# Patient Record
Sex: FEMALE | Race: WHITE | NOT HISPANIC OR LATINO | Employment: FULL TIME | URBAN - NONMETROPOLITAN AREA
[De-identification: names, ages, dates, MRNs, and addresses within clinical notes are randomized per-mention and may not be internally consistent; named-entity substitution may affect disease eponyms.]

---

## 2017-04-12 ENCOUNTER — APPOINTMENT (OUTPATIENT)
Dept: PREADMISSION TESTING | Facility: HOSPITAL | Age: 27
End: 2017-04-12

## 2017-04-12 ENCOUNTER — HOSPITAL ENCOUNTER (OUTPATIENT)
Dept: GENERAL RADIOLOGY | Facility: HOSPITAL | Age: 27
Discharge: HOME | End: 2017-04-12
Admitting: ORTHOPAEDIC SURGERY

## 2017-04-12 VITALS — HEIGHT: 66 IN | BODY MASS INDEX: 32.14 KG/M2 | WEIGHT: 200 LBS

## 2017-04-12 LAB
ANION GAP SERPL CALCULATED.3IONS-SCNC: 9.7 MMOL/L
BUN BLD-MCNC: 7 MG/DL (ref 7–20)
BUN/CREAT SERPL: 10 (ref 7.1–23.5)
CALCIUM SPEC-SCNC: 9.4 MG/DL (ref 8.4–10.2)
CHLORIDE SERPL-SCNC: 106 MMOL/L (ref 98–107)
CO2 SERPL-SCNC: 30 MMOL/L (ref 26–30)
CREAT BLD-MCNC: 0.7 MG/DL (ref 0.6–1.3)
DEPRECATED RDW RBC AUTO: 42.1 FL (ref 37–54)
ERYTHROCYTE [DISTWIDTH] IN BLOOD BY AUTOMATED COUNT: 12.8 % (ref 11.5–14.5)
GFR SERPL CREATININE-BSD FRML MDRD: 100 ML/MIN/1.73
GLUCOSE BLD-MCNC: 91 MG/DL (ref 74–98)
HCT VFR BLD AUTO: 40.4 % (ref 37–47)
HGB BLD-MCNC: 13.3 G/DL (ref 12–16)
MCH RBC QN AUTO: 29.8 PG (ref 27–31)
MCHC RBC AUTO-ENTMCNC: 32.9 G/DL (ref 30–37)
MCV RBC AUTO: 90.4 FL (ref 81–99)
PLATELET # BLD AUTO: 208 10*3/MM3 (ref 130–400)
PMV BLD AUTO: 11.2 FL (ref 6–12)
POTASSIUM BLD-SCNC: 3.7 MMOL/L (ref 3.5–5.1)
RBC # BLD AUTO: 4.47 10*6/MM3 (ref 4.2–5.4)
SODIUM BLD-SCNC: 142 MMOL/L (ref 137–145)
WBC NRBC COR # BLD: 5.72 10*3/MM3 (ref 4.8–10.8)

## 2017-04-12 PROCEDURE — 71020 HC CHEST PA AND LATERAL: CPT

## 2017-04-12 PROCEDURE — 85027 COMPLETE CBC AUTOMATED: CPT

## 2017-04-12 PROCEDURE — 80048 BASIC METABOLIC PNL TOTAL CA: CPT

## 2017-04-12 PROCEDURE — 36415 COLL VENOUS BLD VENIPUNCTURE: CPT

## 2017-04-12 RX ORDER — NAPROXEN 500 MG/1
500 TABLET ORAL 2 TIMES DAILY WITH MEALS
COMMUNITY
End: 2018-03-21

## 2017-04-19 ENCOUNTER — ANESTHESIA EVENT (OUTPATIENT)
Dept: PERIOP | Facility: HOSPITAL | Age: 27
End: 2017-04-19

## 2017-04-19 ENCOUNTER — HOSPITAL ENCOUNTER (OUTPATIENT)
Facility: HOSPITAL | Age: 27
Setting detail: HOSPITAL OUTPATIENT SURGERY
Discharge: HOME | End: 2017-04-19
Attending: ORTHOPAEDIC SURGERY | Admitting: ORTHOPAEDIC SURGERY

## 2017-04-19 ENCOUNTER — ANESTHESIA (OUTPATIENT)
Dept: PERIOP | Facility: HOSPITAL | Age: 27
End: 2017-04-19

## 2017-04-19 VITALS
SYSTOLIC BLOOD PRESSURE: 116 MMHG | TEMPERATURE: 98.2 F | OXYGEN SATURATION: 98 % | DIASTOLIC BLOOD PRESSURE: 66 MMHG | RESPIRATION RATE: 16 BRPM | HEART RATE: 84 BPM

## 2017-04-19 PROCEDURE — 25010000002 EPINEPHRINE 1 MG/ML SOLUTION: Performed by: ORTHOPAEDIC SURGERY

## 2017-04-19 PROCEDURE — 25010000002 MEPERIDINE PER 100 MG

## 2017-04-19 PROCEDURE — 25010000002 MIDAZOLAM PER 1 MG: Performed by: NURSE ANESTHETIST, CERTIFIED REGISTERED

## 2017-04-19 PROCEDURE — 25010000002 HYDROMORPHONE PER 4 MG

## 2017-04-19 PROCEDURE — 25010000002 DEXAMETHASONE PER 1 MG: Performed by: NURSE ANESTHETIST, CERTIFIED REGISTERED

## 2017-04-19 PROCEDURE — 25010000002 FENTANYL CITRATE (PF) 250 MCG/5ML SOLUTION: Performed by: NURSE ANESTHETIST, CERTIFIED REGISTERED

## 2017-04-19 PROCEDURE — 25010000002 PROPOFOL 200 MG/20ML EMULSION: Performed by: NURSE ANESTHETIST, CERTIFIED REGISTERED

## 2017-04-19 RX ORDER — SODIUM CHLORIDE, SODIUM LACTATE, POTASSIUM CHLORIDE, CALCIUM CHLORIDE 600; 310; 30; 20 MG/100ML; MG/100ML; MG/100ML; MG/100ML
1000 INJECTION, SOLUTION INTRAVENOUS CONTINUOUS PRN
Status: DISCONTINUED | OUTPATIENT
Start: 2017-04-19 | End: 2017-04-19 | Stop reason: HOSPADM

## 2017-04-19 RX ORDER — MEPERIDINE HYDROCHLORIDE 25 MG/ML
25 INJECTION INTRAMUSCULAR; INTRAVENOUS; SUBCUTANEOUS
Status: DISCONTINUED | OUTPATIENT
Start: 2017-04-19 | End: 2017-04-19 | Stop reason: HOSPADM

## 2017-04-19 RX ORDER — OXYCODONE AND ACETAMINOPHEN 10; 325 MG/1; MG/1
TABLET ORAL
Status: DISCONTINUED
Start: 2017-04-19 | End: 2017-04-19 | Stop reason: HOSPADM

## 2017-04-19 RX ORDER — BUPIVACAINE HYDROCHLORIDE AND EPINEPHRINE 5; 5 MG/ML; UG/ML
INJECTION, SOLUTION EPIDURAL; INTRACAUDAL; PERINEURAL
Status: DISCONTINUED
Start: 2017-04-19 | End: 2017-04-19 | Stop reason: HOSPADM

## 2017-04-19 RX ORDER — ONDANSETRON 2 MG/ML
4 INJECTION INTRAMUSCULAR; INTRAVENOUS ONCE
Status: DISCONTINUED | OUTPATIENT
Start: 2017-04-19 | End: 2017-04-19 | Stop reason: HOSPADM

## 2017-04-19 RX ORDER — FENTANYL CITRATE 50 UG/ML
INJECTION, SOLUTION INTRAMUSCULAR; INTRAVENOUS AS NEEDED
Status: DISCONTINUED | OUTPATIENT
Start: 2017-04-19 | End: 2017-04-19 | Stop reason: SURG

## 2017-04-19 RX ORDER — GABAPENTIN 300 MG/1
CAPSULE ORAL
Status: DISCONTINUED
Start: 2017-04-19 | End: 2017-04-19 | Stop reason: HOSPADM

## 2017-04-19 RX ORDER — PROPOFOL 10 MG/ML
INJECTION, EMULSION INTRAVENOUS AS NEEDED
Status: DISCONTINUED | OUTPATIENT
Start: 2017-04-19 | End: 2017-04-19 | Stop reason: SURG

## 2017-04-19 RX ORDER — ACETAMINOPHEN 500 MG
TABLET ORAL
Status: DISCONTINUED
Start: 2017-04-19 | End: 2017-04-19 | Stop reason: HOSPADM

## 2017-04-19 RX ORDER — OXYCODONE AND ACETAMINOPHEN 10; 325 MG/1; MG/1
1 TABLET ORAL ONCE
Status: DISCONTINUED | OUTPATIENT
Start: 2017-04-19 | End: 2017-04-19 | Stop reason: HOSPADM

## 2017-04-19 RX ORDER — GABAPENTIN 400 MG/1
800 CAPSULE ORAL ONCE
Status: DISCONTINUED | OUTPATIENT
Start: 2017-04-19 | End: 2017-04-19 | Stop reason: HOSPADM

## 2017-04-19 RX ORDER — SODIUM CHLORIDE 0.9 % (FLUSH) 0.9 %
3 SYRINGE (ML) INJECTION AS NEEDED
Status: DISCONTINUED | OUTPATIENT
Start: 2017-04-19 | End: 2017-04-19 | Stop reason: HOSPADM

## 2017-04-19 RX ORDER — MIDAZOLAM HYDROCHLORIDE 1 MG/ML
INJECTION INTRAMUSCULAR; INTRAVENOUS AS NEEDED
Status: DISCONTINUED | OUTPATIENT
Start: 2017-04-19 | End: 2017-04-19 | Stop reason: SURG

## 2017-04-19 RX ORDER — ONDANSETRON 2 MG/ML
INJECTION INTRAMUSCULAR; INTRAVENOUS
Status: DISCONTINUED
Start: 2017-04-19 | End: 2017-04-19 | Stop reason: HOSPADM

## 2017-04-19 RX ORDER — DEXAMETHASONE SODIUM PHOSPHATE 4 MG/ML
INJECTION, SOLUTION INTRA-ARTICULAR; INTRALESIONAL; INTRAMUSCULAR; INTRAVENOUS; SOFT TISSUE AS NEEDED
Status: DISCONTINUED | OUTPATIENT
Start: 2017-04-19 | End: 2017-04-19 | Stop reason: SURG

## 2017-04-19 RX ORDER — HYDROCODONE BITARTRATE AND ACETAMINOPHEN 7.5; 325 MG/1; MG/1
TABLET ORAL
Status: COMPLETED
Start: 2017-04-19 | End: 2017-04-19

## 2017-04-19 RX ORDER — FAMOTIDINE 10 MG/ML
INJECTION, SOLUTION INTRAVENOUS
Status: DISCONTINUED
Start: 2017-04-19 | End: 2017-04-19 | Stop reason: HOSPADM

## 2017-04-19 RX ORDER — CELECOXIB 100 MG/1
CAPSULE ORAL
Status: DISCONTINUED
Start: 2017-04-19 | End: 2017-04-19 | Stop reason: HOSPADM

## 2017-04-19 RX ORDER — CELECOXIB 100 MG/1
200 CAPSULE ORAL ONCE
Status: DISCONTINUED | OUTPATIENT
Start: 2017-04-19 | End: 2017-04-19 | Stop reason: HOSPADM

## 2017-04-19 RX ORDER — EPINEPHRINE 1 MG/ML
INJECTION INTRAMUSCULAR; INTRAVENOUS; SUBCUTANEOUS AS NEEDED
Status: DISCONTINUED | OUTPATIENT
Start: 2017-04-19 | End: 2017-04-19 | Stop reason: HOSPADM

## 2017-04-19 RX ORDER — MEPERIDINE HYDROCHLORIDE 25 MG/ML
INJECTION INTRAMUSCULAR; INTRAVENOUS; SUBCUTANEOUS
Status: COMPLETED
Start: 2017-04-19 | End: 2017-04-19

## 2017-04-19 RX ORDER — ACETAMINOPHEN 325 MG/1
650 TABLET ORAL ONCE
Status: DISCONTINUED | OUTPATIENT
Start: 2017-04-19 | End: 2017-04-19 | Stop reason: HOSPADM

## 2017-04-19 RX ORDER — HYDROCODONE BITARTRATE AND ACETAMINOPHEN 7.5; 325 MG/1; MG/1
1-2 TABLET ORAL EVERY 4 HOURS PRN
Qty: 50 TABLET | Refills: 0 | Status: SHIPPED | OUTPATIENT
Start: 2017-04-19 | End: 2017-05-25

## 2017-04-19 RX ORDER — BUPIVACAINE HYDROCHLORIDE AND EPINEPHRINE 5; 5 MG/ML; UG/ML
INJECTION, SOLUTION EPIDURAL; INTRACAUDAL; PERINEURAL AS NEEDED
Status: DISCONTINUED | OUTPATIENT
Start: 2017-04-19 | End: 2017-04-19 | Stop reason: HOSPADM

## 2017-04-19 RX ADMIN — FENTANYL CITRATE 50 MCG: 50 INJECTION, SOLUTION INTRAMUSCULAR; INTRAVENOUS at 07:59

## 2017-04-19 RX ADMIN — SODIUM CHLORIDE, POTASSIUM CHLORIDE, SODIUM LACTATE AND CALCIUM CHLORIDE 1000 ML: 600; 310; 30; 20 INJECTION, SOLUTION INTRAVENOUS at 07:00

## 2017-04-19 RX ADMIN — LIDOCAINE HYDROCHLORIDE 80 MG: 20 INJECTION, SOLUTION INTRAVENOUS at 07:59

## 2017-04-19 RX ADMIN — PROPOFOL 150 MG: 10 INJECTION, EMULSION INTRAVENOUS at 07:59

## 2017-04-19 RX ADMIN — DEXAMETHASONE SODIUM PHOSPHATE 4 MG: 4 INJECTION, SOLUTION INTRAMUSCULAR; INTRAVENOUS at 08:07

## 2017-04-19 RX ADMIN — SODIUM CHLORIDE 600 MG: 9 INJECTION, SOLUTION INTRAVENOUS at 07:55

## 2017-04-19 RX ADMIN — HYDROCODONE BITARTRATE AND ACETAMINOPHEN 1 TABLET: 7.5; 325 TABLET ORAL at 10:02

## 2017-04-19 RX ADMIN — Medication 0.5 MG: at 09:05

## 2017-04-19 RX ADMIN — FENTANYL CITRATE 50 MCG: 50 INJECTION, SOLUTION INTRAMUSCULAR; INTRAVENOUS at 08:01

## 2017-04-19 RX ADMIN — HYDROMORPHONE HYDROCHLORIDE 0.5 MG: 1 INJECTION, SOLUTION INTRAMUSCULAR; INTRAVENOUS; SUBCUTANEOUS at 09:05

## 2017-04-19 RX ADMIN — MEPERIDINE HYDROCHLORIDE 25 MG: 25 INJECTION, SOLUTION INTRAMUSCULAR; INTRAVENOUS; SUBCUTANEOUS at 08:47

## 2017-04-19 RX ADMIN — MIDAZOLAM HYDROCHLORIDE 2 MG: 1 INJECTION, SOLUTION INTRAMUSCULAR; INTRAVENOUS at 07:58

## 2017-04-19 RX ADMIN — MEPERIDINE HYDROCHLORIDE 25 MG: 25 INJECTION INTRAMUSCULAR; INTRAVENOUS; SUBCUTANEOUS at 08:47

## 2017-04-19 NOTE — NURSING NOTE
0935- drifts to sleep easily, awakens and responds appropiately. No moaning, grimacing or restlessness. lle elevated on pillow, ice pack in place, pedal pulse +, cap refill normal. Awakens and c/o throbbing pain. o2 sat at 94%, bp below baseline. Report to [facility], transferred to [facility] .

## 2017-04-19 NOTE — ANESTHESIA POSTPROCEDURE EVALUATION
Patient: Olga Flores    Procedure Summary     Date Anesthesia Start Anesthesia Stop Room / Location    04/19/17 0755  [facility] / [facility]       Procedure Diagnosis Surgeon Provider    RIGHT KNEE ARTHROSCOPY WITH LATERAL RELEASE (Right Knee) No diagnosis on file. MD Lane Ward CRNA          Anesthesia Type: general  Last vitals  BP      Temp      Pulse     Resp      SpO2        Post Anesthesia Care and Evaluation    Patient location during evaluation: PACU  Patient participation: complete - patient participated  Level of consciousness: awake and alert  Pain score: 9  Pain management: satisfactory to patient  Airway patency: patent  Anesthetic complications: No anesthetic complications  PONV Status: none  Cardiovascular status: acceptable and stable  Respiratory status: acceptable and face mask  Hydration status: acceptable

## 2017-04-19 NOTE — OP NOTE
DATE OF PROCEDURE:  04/19/2017    PREOPERATIVE DIAGNOSIS: Right knee lateral femoral compression syndrome.    POSTOPERATIVE DIAGNOSIS: Right knee lateral femoral compression syndrome.    PROCEDURES PERFORMED:  Right knee arthroscopy, lateral release.      SURGEON:  Rogelio Her MD    ANESTHESIA:  General.      INDICATION FOR PROCEDURE:  This 26-year-old female complained of persistent pain to her anterolateral aspect of her knee.  She had negative tilt.  She had continued symptoms.  She failed all conservative measures and elected to proceed with operative intervention.  She understood the procedure, risks, benefits and decided to proceed.      DESCRIPTION OF PROCEDURE:  The patient was identified in holding room.  Right knee was marked.  She was taken to the operating room, administered general anesthesia per anesthesia team. She was administered preoperative antibiotics. The patient was positioned, prepped, and draped in sterile fashion to her right lower extremity. Timeout was performed identifying the right lower extremity and patient as the correct site and patient. Attention was made to her right knee where an anterolateral portal was made.  Scope was introduced in the joint.  Anteromedial portal made under direct vision.  Diagnostic scope was then performed. Evaluation of the medial side showed no meniscal articular pathology, notch to the ACL and PCL was intact, lateral side showed no meniscal articular pathology.  Patellofemoral:  Significant for arthritis and tightness on the lateral side.  This was addressed using a ligament chisel.  A lateral release was performed starting at the superior pole of the patella around to the inferior pole.  Adequate positioning was noted and release was noted.  Wound was closed with 4-0 Monocryl.  Sterile dressings were applied.  The patient emerged from anesthesia and taken to the PACU in stable condition. Postoperative plan is for weightbearing as  tolerated.            Rogelio Her M.D.  D: VICTORINA 04/19/2017 08:29:02  T: oscar 04/19/2017 10:09:29  Job ID: [id number]  Document ID: [id number]

## 2017-04-19 NOTE — NURSING NOTE
Pre op medications (Gabapentin, Tylenol, Celebrex, Zofran, Pepcid)  ordered per Joe Escamilla CRNA.  ABDULKADIR Escamilla CRNA verbally verified he administered pre op medications with this RN X 2.

## 2017-04-19 NOTE — DISCHARGE INSTRUCTIONS
To assist you in voiding:  Drink plenty of fluids  Listen to running water while attempting to void.  If you are unable to urinate and you have an uncomfortable urge to void or it has been   6 hours since you were discharged, return [facility] today.    No pushing,pulling,tugging,heavy lifting, or strenuous activity   No major decision making,driving,or drinking alcoholic beverages for 24 hours due to the sedation you received  Always use good hand hygiene/washing technique  No driving on pain medication.     Keep right knee elevated and use ice pack as directed.  Weight bearing as tolerated with crutches.

## 2017-04-19 NOTE — PLAN OF CARE
Problem: Patient Care Overview (Adult)  Goal: Plan of Care Review  Outcome: Ongoing (interventions implemented as appropriate)    04/19/17 0990   Coping/Psychosocial Response Interventions   Plan Of Care Reviewed With patient   Outcome Evaluation   Outcome Summary/Follow up Plan pacu discharge criteria met, transfered to [facility] .

## 2017-04-19 NOTE — ANESTHESIA PREPROCEDURE EVALUATION
Anesthesia Evaluation     Patient summary reviewed and Nursing notes reviewed   no history of anesthetic complications:  NPO Status: > 8 hours   Airway   Mallampati: I  TM distance: >3 FB  Neck ROM: full  no difficulty expected  Dental - normal exam     Pulmonary - normal exam   (+) a smoker (1ppd x 10+ years. Smoked this am) Current,   Cardiovascular - negative cardio ROS and normal exam    Rhythm: regular  Rate: normal        Neuro/Psych  GI/Hepatic/Renal/Endo    (+) obesity (BMI 32),  GERD well controlled, chronic renal disease stones,     Musculoskeletal     (+) back pain,   Abdominal  - normal exam    Abdomen: soft.  Bowel sounds: normal.   Substance History - negative use     OB/GYN negative ob/gyn ROS   (-)  Pregnant        Other - negative ROS                                 Anesthesia Plan    ASA 2     general   (Risks and benefits discussed including risk of aspiration, recall and dental damage. All patient questions answered. Will continue with POC.    GA with LMA    Right Adductor Canal single shot if needed in PACU  Risk vs Benefits discussed with patient to include infection, bleeding at the site, paresthesia, and incomplete analgesia.  )  intravenous induction   Anesthetic plan and risks discussed with patient.

## 2017-04-19 NOTE — PLAN OF CARE
Problem: Perioperative Period (Adult)  Intervention: Promote Pulmonary Hygiene and Secretion Clearance    04/19/17 0851   Promote Aggressive Pulmonary Hygiene/Secretion Management   Cough And Deep Breathing done independently per patient   Positioning   Head Of Bed (HOB) Position HOB at 20-30 degrees       Intervention: Monitor/Manage Pain    04/19/17 0851   Safety Interventions   Medication Review/Management medications reviewed       Intervention: Monitor/Manage Postoperative Bleeding    04/19/17 0851   Safety Interventions   Bleeding Management dressing monitored       Intervention: Promote Normothermia    04/19/17 0834   Cardiac Interventions   Warming Thermoregulation Maintenance warm blankets applied

## 2017-04-19 NOTE — ANESTHESIA PROCEDURE NOTES
Airway  Airway not difficult    General Information and Staff    Patient location during procedure: OR  CRNA: UDNIA VENEGAS    Indications and Patient Condition  Indications for airway management: airway protection    Preoxygenated: yes  Mask difficulty assessment: 0 - not attempted    Final Airway Details  Final airway type: supraglottic airway      Successful airway: classic  Size 4    Number of attempts at approach: 1    Additional Comments  Cuff pressure/leak less than [id number]

## 2017-04-19 NOTE — NURSING NOTE
Pre op medications (Gabapentin, Tylenol, Celebrex, Zofran, Pepcid, Oxycodone-Acetaminophen)  ordered per Joe Escamilla CRNA.  ABDULKADIR Escamilla CRNA verbally verified he administered pre op medications with this RN X 2.

## 2017-04-19 NOTE — BRIEF OP NOTE
KNEE ARTHROSCOPY  Procedure Note    Olga Flores  4/19/2017    Pre-op Diagnosis:   Right knee lateral femoral compression syndrome  Post-op Diagnosis:     same  Procedure/CPT® Codes:      Procedure(s):  RIGHT KNEE ARTHROSCOPY WITH LATERAL RELEASE    Surgeon(s):  Rogelio Her MD    Anesthesia: Choice    Staff:   Circulator: Cj Cruz RN  Scrub Person: Akilah Brown; Telma Flores    Estimated Blood Loss: * No values recorded between 4/19/2017  7:56 AM and 4/19/2017  8:26 AM *  Urine Voided: * No values recorded between 4/19/2017  7:56 AM and 4/19/2017  8:26 AM *    Specimens:                * No specimens in log *      Drains:           Findings:see dict  Complications: none  Rogelio Her MD     Date: 4/19/2017  Time: 8:28 AM

## 2017-05-25 ENCOUNTER — OFFICE VISIT (OUTPATIENT)
Dept: OBSTETRICS AND GYNECOLOGY | Facility: CLINIC | Age: 27
End: 2017-05-25

## 2017-05-25 VITALS
BODY MASS INDEX: 33.91 KG/M2 | HEIGHT: 66 IN | DIASTOLIC BLOOD PRESSURE: 70 MMHG | SYSTOLIC BLOOD PRESSURE: 126 MMHG | WEIGHT: 211 LBS

## 2017-05-25 DIAGNOSIS — N95.1 MENOPAUSAL SYMPTOMS: Primary | ICD-10-CM

## 2017-05-25 PROCEDURE — 99213 OFFICE O/P EST LOW 20 MIN: CPT | Performed by: OBSTETRICS & GYNECOLOGY

## 2017-05-25 RX ORDER — HYDROXYZINE HYDROCHLORIDE 10 MG/1
10 TABLET, FILM COATED ORAL 3 TIMES DAILY PRN
COMMUNITY
End: 2018-03-21

## 2017-05-25 RX ORDER — HYDROCODONE BITARTRATE AND ACETAMINOPHEN 5; 325 MG/1; MG/1
1 TABLET ORAL EVERY 6 HOURS PRN
COMMUNITY
End: 2017-08-03

## 2017-05-26 LAB
ESTRADIOL SERPL-MCNC: 118.1 PG/ML
FSH SERPL-ACNC: 3.9 MIU/ML
TSH SERPL DL<=0.005 MIU/L-ACNC: 2.05 MIU/ML (ref 0.47–4.68)

## 2017-08-03 ENCOUNTER — OFFICE VISIT (OUTPATIENT)
Dept: NEUROLOGY | Facility: CLINIC | Age: 27
End: 2017-08-03

## 2017-08-03 VITALS
BODY MASS INDEX: 34.75 KG/M2 | DIASTOLIC BLOOD PRESSURE: 68 MMHG | HEIGHT: 66 IN | OXYGEN SATURATION: 99 % | HEART RATE: 79 BPM | SYSTOLIC BLOOD PRESSURE: 124 MMHG | WEIGHT: 216.2 LBS

## 2017-08-03 DIAGNOSIS — G43.019 INTRACTABLE MIGRAINE WITHOUT AURA AND WITHOUT STATUS MIGRAINOSUS: ICD-10-CM

## 2017-08-03 DIAGNOSIS — G47.19 EXCESSIVE DAYTIME SLEEPINESS: ICD-10-CM

## 2017-08-03 DIAGNOSIS — R55 SYNCOPE AND COLLAPSE: Primary | ICD-10-CM

## 2017-08-03 PROCEDURE — 99215 OFFICE O/P EST HI 40 MIN: CPT | Performed by: NURSE PRACTITIONER

## 2017-08-03 RX ORDER — NEOMYCIN SULFATE, POLYMYXIN B SULFATE AND DEXAMETHASONE 3.5; 10000; 1 MG/ML; [USP'U]/ML; MG/ML
SUSPENSION/ DROPS OPHTHALMIC
COMMUNITY
Start: 2017-07-25 | End: 2018-03-21

## 2017-08-03 RX ORDER — PROPRANOLOL HYDROCHLORIDE 10 MG/1
TABLET ORAL
COMMUNITY
Start: 2017-07-25 | End: 2018-05-08

## 2017-08-03 RX ORDER — TOPIRAMATE 25 MG/1
25 TABLET ORAL SEE ADMIN INSTRUCTIONS
Qty: 120 TABLET | Refills: 3 | Status: SHIPPED | OUTPATIENT
Start: 2017-08-03 | End: 2017-09-02

## 2017-08-03 RX ORDER — ONDANSETRON 4 MG/1
TABLET, ORALLY DISINTEGRATING ORAL
COMMUNITY
Start: 2017-07-20 | End: 2018-03-21
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
## 2017-08-23 ENCOUNTER — HOSPITAL ENCOUNTER (OUTPATIENT)
Dept: SLEEP MEDICINE | Facility: HOSPITAL | Age: 27
Discharge: HOME | End: 2017-08-23
Admitting: NURSE PRACTITIONER

## 2017-08-23 DIAGNOSIS — G47.19 EXCESSIVE DAYTIME SLEEPINESS: ICD-10-CM

## 2017-08-23 PROCEDURE — 95806 SLEEP STUDY UNATT&RESP EFFT: CPT

## 2017-08-23 PROCEDURE — 95806 SLEEP STUDY UNATT&RESP EFFT: CPT | Performed by: PSYCHIATRY & NEUROLOGY

## 2017-09-15 PROBLEM — Z72.0 TOBACCO ABUSE: Status: ACTIVE | Noted: 2017-09-15

## 2017-09-15 PROBLEM — H54.7 VISION PROBLEMS: Status: ACTIVE | Noted: 2017-09-15

## 2017-09-15 PROBLEM — D64.9 ANEMIA: Status: ACTIVE | Noted: 2017-09-15

## 2017-09-15 PROBLEM — F32.A ANXIETY AND DEPRESSION: Status: ACTIVE | Noted: 2017-09-15

## 2017-09-15 PROBLEM — F41.9 ANXIETY AND DEPRESSION: Status: ACTIVE | Noted: 2017-09-15

## 2017-09-18 ENCOUNTER — OFFICE VISIT (OUTPATIENT)
Dept: NEUROLOGY | Facility: CLINIC | Age: 27
End: 2017-09-18

## 2017-09-18 VITALS — HEIGHT: 66 IN | HEART RATE: 92 BPM | WEIGHT: 206.6 LBS | OXYGEN SATURATION: 98 % | BODY MASS INDEX: 33.2 KG/M2

## 2017-09-18 DIAGNOSIS — G47.19 EXCESSIVE DAYTIME SLEEPINESS: ICD-10-CM

## 2017-09-18 DIAGNOSIS — G43.019 INTRACTABLE MIGRAINE WITHOUT AURA AND WITHOUT STATUS MIGRAINOSUS: Primary | ICD-10-CM

## 2017-09-18 DIAGNOSIS — R55 SYNCOPE AND COLLAPSE: ICD-10-CM

## 2017-09-18 PROCEDURE — 99214 OFFICE O/P EST MOD 30 MIN: CPT | Performed by: NURSE PRACTITIONER

## 2017-09-18 RX ORDER — DICLOFENAC SODIUM 75 MG/1
TABLET, DELAYED RELEASE ORAL
COMMUNITY
Start: 2017-09-14 | End: 2018-11-28 | Stop reason: SDDI

## 2017-09-18 RX ORDER — TOPIRAMATE 25 MG/1
TABLET ORAL
COMMUNITY
Start: 2017-09-07 | End: 2017-09-18

## 2017-09-18 RX ORDER — TOPIRAMATE 50 MG/1
50 TABLET, FILM COATED ORAL 2 TIMES DAILY
Qty: 60 TABLET | Refills: 2 | Status: SHIPPED | OUTPATIENT
Start: 2017-09-18 | End: 2017-09-18 | Stop reason: SDUPTHER

## 2017-09-18 RX ORDER — CEFDINIR 300 MG/1
CAPSULE ORAL
COMMUNITY
Start: 2017-09-13 | End: 2018-03-21

## 2017-09-18 RX ORDER — TOPIRAMATE 50 MG/1
50 TABLET, FILM COATED ORAL 2 TIMES DAILY
Qty: 60 TABLET | Refills: 2 | Status: SHIPPED | OUTPATIENT
Start: 2017-09-18 | End: 2018-01-23 | Stop reason: SDUPTHER
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
## 2018-01-23 RX ORDER — TOPIRAMATE 50 MG/1
50 TABLET, FILM COATED ORAL 2 TIMES DAILY
Qty: 60 TABLET | Refills: 2 | Status: SHIPPED | OUTPATIENT
Start: 2018-01-23 | End: 2018-01-23 | Stop reason: SDUPTHER

## 2018-01-23 RX ORDER — TOPIRAMATE 50 MG/1
50 TABLET, FILM COATED ORAL 2 TIMES DAILY
Qty: 60 TABLET | Refills: 2 | Status: SHIPPED | OUTPATIENT
Start: 2018-01-23 | End: 2018-03-21 | Stop reason: SDUPTHER

## 2018-03-21 ENCOUNTER — OFFICE VISIT (OUTPATIENT)
Dept: NEUROLOGY | Facility: CLINIC | Age: 28
End: 2018-03-21

## 2018-03-21 VITALS
HEART RATE: 84 BPM | BODY MASS INDEX: 31.95 KG/M2 | WEIGHT: 198.8 LBS | DIASTOLIC BLOOD PRESSURE: 64 MMHG | HEIGHT: 66 IN | OXYGEN SATURATION: 100 % | SYSTOLIC BLOOD PRESSURE: 106 MMHG

## 2018-03-21 DIAGNOSIS — G47.19 EXCESSIVE DAYTIME SLEEPINESS: ICD-10-CM

## 2018-03-21 DIAGNOSIS — G43.019 INTRACTABLE MIGRAINE WITHOUT AURA AND WITHOUT STATUS MIGRAINOSUS: Primary | ICD-10-CM

## 2018-03-21 PROCEDURE — 99213 OFFICE O/P EST LOW 20 MIN: CPT | Performed by: NURSE PRACTITIONER

## 2018-03-21 RX ORDER — TOPIRAMATE 50 MG/1
50 TABLET, FILM COATED ORAL 2 TIMES DAILY
Qty: 60 TABLET | Refills: 11 | Status: SHIPPED | OUTPATIENT
Start: 2018-03-21 | End: 2018-06-26 | Stop reason: SDUPTHER

## 2018-03-21 RX ORDER — DULOXETIN HYDROCHLORIDE 20 MG/1
CAPSULE, DELAYED RELEASE ORAL
COMMUNITY
Start: 2018-03-06 | End: 2019-02-12

## 2018-05-08 ENCOUNTER — OFFICE VISIT (OUTPATIENT)
Dept: OBSTETRICS AND GYNECOLOGY | Facility: CLINIC | Age: 28
End: 2018-05-08

## 2018-05-08 VITALS
BODY MASS INDEX: 31.34 KG/M2 | SYSTOLIC BLOOD PRESSURE: 112 MMHG | WEIGHT: 195 LBS | DIASTOLIC BLOOD PRESSURE: 70 MMHG | HEIGHT: 66 IN

## 2018-05-08 DIAGNOSIS — N76.1 SUBACUTE VAGINITIS: Primary | ICD-10-CM

## 2018-05-08 PROCEDURE — 99213 OFFICE O/P EST LOW 20 MIN: CPT | Performed by: OBSTETRICS & GYNECOLOGY

## 2018-05-08 RX ORDER — TRAMADOL HYDROCHLORIDE 50 MG/1
TABLET ORAL
COMMUNITY
Start: 2018-05-04 | End: 2018-05-08

## 2018-05-08 RX ORDER — AMITRIPTYLINE HYDROCHLORIDE 10 MG/1
TABLET, FILM COATED ORAL
COMMUNITY
Start: 2018-04-18 | End: 2018-06-20

## 2018-05-08 NOTE — PROGRESS NOTES
Subjective   Chief Complaint   Patient presents with   • Vaginitis     Pt stated she has had foul odor ( white discharge) for 2-3 months, has been treated for BV, yeast infection at her PCP but states she is still having the foul odor.     Olga Flores is a 27 y.o. year old .  No LMP recorded. Patient has had a hysterectomy.  She presents to be seen because of treated for yeast x 2, used OTC creams and wipes. Gets strong odor, only slight whitish d/c.   Symptoms flare with intercourse and last for 3-4 days afterwards.  Overall patient's really just treated for presumed yeast.  No cultures were done.  This is been going on for 2-3 months. NO new partners .     OTHER COMPLAINTS:  Nothing else    The following portions of the patient's history were reviewed and updated as appropriate:  She  has a past medical history of Abdominal pain; Abdominal swelling; Allergic bronchitis; Anxiety; Backache; Blood in stool; Change in bowel movement; Constipation; Depression; Diarrhea; Environmental allergies; GERD (gastroesophageal reflux disease); Heartburn; History of heavy periods; MRSA infection; Kidney stones; Lupus; Mass of ovary; Melena; Migraine; Nausea and vomiting; No energy; Sinus problem; Tattoo; Vertigo; and Wears glasses.  She  does not have any pertinent problems on file.  She  has a past surgical history that includes Hysterectomy;  section, low transverse; Laparoscopy; Endometrial ablation; Colonoscopy; Polypectomy; Knee Arthroscopy (Right, 2017); Abdominal surgery; and Other surgical history.  Her family history includes Cancer in her father and mother; Liver disease in her father and mother.  She  reports that she has been smoking Cigarettes.  She has a 10.00 pack-year smoking history. She has never used smokeless tobacco. She reports that she does not drink alcohol or use drugs.  Current Outpatient Prescriptions   Medication Sig Dispense Refill   • diclofenac (VOLTAREN) 75 MG EC tablet   "    • topiramate (TOPAMAX) 50 MG tablet Take 1 tablet by mouth 2 (Two) Times a Day. 60 tablet 11   • amitriptyline (ELAVIL) 10 MG tablet      • DULoxetine (CYMBALTA) 20 MG capsule        No current facility-administered medications for this visit.      Current Outpatient Prescriptions on File Prior to Visit   Medication Sig   • diclofenac (VOLTAREN) 75 MG EC tablet    • topiramate (TOPAMAX) 50 MG tablet Take 1 tablet by mouth 2 (Two) Times a Day.   • DULoxetine (CYMBALTA) 20 MG capsule    • [DISCONTINUED] propranolol (INDERAL) 10 MG tablet Take one three times daily     No current facility-administered medications on file prior to visit.      She is allergic to morphine and related; baclofen; iron supplement [ferrous fumarate]; and penicillins.    Smoking status: Current Every Day Smoker                                                   Packs/day: 1.00      Years: 10.00        Types: Cigarettes  Smokeless tobacco: Never Used                        Review of Systems  Consitutional POS: nothing reported    NEG: anorexia or night sweats   Gastointestinal POS: nothing reported    NEG: bloating, change in bowel habits, melena or reflux symptoms   Genitourinary POS: nothing reported    NEG: dysuria or hematuria   Integument POS: nothing reported    NEG: moles that are changing in size, shape, color or rashes   Breast POS: nothing reported    NEG: persistent breast lump, skin dimpling or nipple discharge         Pertinent items are noted in HPI.          Objective   /70   Ht 167.6 cm (66\")   Wt 88.5 kg (195 lb)   BMI 31.47 kg/m²     General:  well developed; well nourished  no acute distress   Skin:  No suspicious lesions seen   Thyroid: not examined   Lungs:  breathing is unlabored  clear to auscultation bilaterally   Heart:  regular rate and rhythm, S1, S2 normal, no murmur, click, rub or gallop   Breasts:  Not performed.   Abdomen: soft, non-tender; no masses  no umbilical or inginual hernias are present  no " hepato-splenomegaly   Pelvis: Clinical staff was present for exam  External genitalia:  normal appearance of the external genitalia including Bartholin's and [place]'s glands.  :  urethral meatus normal;  Vaginal:  normal pink mucosa without prolapse or lesions. discharge present -  white;  Cervix:  absent.  Uterus:  absent.  Adnexa:  normal bimanual exam of the adnexa.  Rectal:  digital rectal exam not performed; anus visually normal appearing.     Psychiatric: Alert and oriented ×3, mood and affect appropriate  HEENT: Atraumatic, normocephalic, normal scleral icterus  Extremities: 2+ pulses bilaterally, no edema      Lab Review   No data reviewed    Imaging   No data reviewed        Assessment   1. Vaginitis with odor     Plan   1. Cultures taken.  2. Discussed suppression depending on culture results.     No orders of the defined types were placed in this encounter.         This note was electronically signed.      May 8, 2018

## 2018-05-11 ENCOUNTER — TELEPHONE (OUTPATIENT)
Dept: OBSTETRICS AND GYNECOLOGY | Facility: CLINIC | Age: 28
End: 2018-05-11

## 2018-05-11 LAB
A VAGINAE DNA VAG QL NAA+PROBE: ABNORMAL SCORE
BVAB2 DNA VAG QL NAA+PROBE: ABNORMAL SCORE
C ALBICANS DNA VAG QL NAA+PROBE: NEGATIVE
C GLABRATA DNA VAG QL NAA+PROBE: NEGATIVE
C TRACH RRNA SPEC QL NAA+PROBE: NEGATIVE
MEGA1 DNA VAG QL NAA+PROBE: ABNORMAL SCORE
N GONORRHOEA RRNA SPEC QL NAA+PROBE: NEGATIVE
T VAGINALIS RRNA SPEC QL NAA+PROBE: NEGATIVE

## 2018-05-11 RX ORDER — METRONIDAZOLE 7.5 MG/G
GEL VAGINAL 2 TIMES DAILY
Qty: 1 TUBE | Refills: 1 | Status: SHIPPED | OUTPATIENT
Start: 2018-05-11 | End: 2018-05-11 | Stop reason: SDUPTHER

## 2018-05-11 RX ORDER — METRONIDAZOLE 500 MG/1
500 TABLET ORAL 2 TIMES DAILY
Qty: 14 TABLET | Refills: 0 | Status: SHIPPED | OUTPATIENT
Start: 2018-05-11 | End: 2018-05-11 | Stop reason: SDUPTHER

## 2018-05-11 RX ORDER — METRONIDAZOLE 500 MG/1
500 TABLET ORAL 2 TIMES DAILY
Qty: 14 TABLET | Refills: 0 | Status: SHIPPED | OUTPATIENT
Start: 2018-05-11 | End: 2018-05-18

## 2018-05-11 RX ORDER — METRONIDAZOLE 7.5 MG/G
GEL VAGINAL 2 TIMES DAILY
Qty: 1 TUBE | Refills: 1 | Status: SHIPPED | OUTPATIENT
Start: 2018-05-11 | End: 2018-11-28

## 2018-05-11 NOTE — TELEPHONE ENCOUNTER
----- Message from Manjeet Egan MD sent at 5/11/2018 11:54 AM EDT -----  : Flagyl 500 mg 1 by mouth twice a day ×7 days.  Also MetroGel 1 applicator vaginally at night twice a week ×3 months.  Patient is to start the MetroGel after completion on the Flagyl

## 2018-05-11 NOTE — PROGRESS NOTES
: Flagyl 500 mg 1 by mouth twice a day ×7 days.  Also MetroGel 1 applicator vaginally at night twice a week ×3 months.  Patient is to start the MetroGel after completion on the Flagyl

## 2018-06-20 ENCOUNTER — TELEPHONE (OUTPATIENT)
Dept: NEUROLOGY | Facility: CLINIC | Age: 28
End: 2018-06-20

## 2018-06-20 NOTE — TELEPHONE ENCOUNTER
Patient called and LM stating that she has been doing really well on her migraine therapy but has noticed that she has had some migraines in the past few weeks and would like to know if and increase could be made to her medication. Pt is currently taking Topamax.     Please advise.

## 2018-06-26 RX ORDER — TOPIRAMATE 50 MG/1
50 TABLET, FILM COATED ORAL 3 TIMES DAILY
Qty: 90 TABLET | Refills: 2 | Status: SHIPPED | OUTPATIENT
Start: 2018-06-26 | End: 2019-04-17 | Stop reason: SDUPTHER

## 2018-06-26 NOTE — TELEPHONE ENCOUNTER
Patient has been notified. And a new prescription with a new quantity has been sent to her pharmacy.

## 2018-11-28 ENCOUNTER — OFFICE VISIT (OUTPATIENT)
Dept: OBSTETRICS AND GYNECOLOGY | Facility: CLINIC | Age: 28
End: 2018-11-28

## 2018-11-28 VITALS
WEIGHT: 188 LBS | BODY MASS INDEX: 30.22 KG/M2 | SYSTOLIC BLOOD PRESSURE: 114 MMHG | HEIGHT: 66 IN | DIASTOLIC BLOOD PRESSURE: 68 MMHG

## 2018-11-28 DIAGNOSIS — R10.2 PELVIC PAIN: Primary | ICD-10-CM

## 2018-11-28 PROCEDURE — 99214 OFFICE O/P EST MOD 30 MIN: CPT | Performed by: OBSTETRICS & GYNECOLOGY

## 2018-11-28 RX ORDER — KETOROLAC TROMETHAMINE 10 MG/1
TABLET, FILM COATED ORAL
Refills: 0 | COMMUNITY
Start: 2018-11-25 | End: 2019-02-12

## 2018-11-28 RX ORDER — NAPROXEN 500 MG/1
TABLET ORAL
Refills: 0 | COMMUNITY
Start: 2018-11-12 | End: 2019-02-12

## 2018-11-28 RX ORDER — HYDROCODONE BITARTRATE AND ACETAMINOPHEN 5; 325 MG/1; MG/1
TABLET ORAL
COMMUNITY
Start: 2018-09-11 | End: 2019-02-12

## 2018-11-28 RX ORDER — DULOXETIN HYDROCHLORIDE 60 MG/1
CAPSULE, DELAYED RELEASE ORAL
COMMUNITY
Start: 2018-11-05 | End: 2019-02-12

## 2018-11-28 NOTE — PROGRESS NOTES
Subjective   Chief Complaint   Patient presents with   • Pelvic Pain     Olga Flores is a 27 y.o. year old .  No LMP recorded. Patient has had a hysterectomy.  She presents to be seen because of pelvic pain arising in the past week that came on with sig actitivy a/w heavy activity--went ot [facility] given Pain meds--felt like she was having contraction. Normal B/B habits.     OTHER COMPLAINTS:  Nothing else    The following portions of the patient's history were reviewed and updated as appropriate:  She  has a past medical history of Abdominal pain, Abdominal swelling, Allergic bronchitis, Anxiety, Backache, Blood in stool, Change in bowel movement, Constipation, Depression, Diarrhea, Environmental allergies, GERD (gastroesophageal reflux disease), Heartburn, History of heavy periods, MRSA infection, Kidney stones, Lupus, Mass of ovary, Melena, Migraine, Nausea and vomiting, No energy, Sinus problem, Tattoo, Vertigo, and Wears glasses.  She does not have any pertinent problems on file.  She  has a past surgical history that includes Hysterectomy;  section, low transverse; Laparoscopy; Endometrial ablation; Colonoscopy; Polypectomy; Abdominal surgery; Other surgical history; and RIGHT KNEE ARTHROSCOPY WITH LATERAL RELEASE (Right, 2017).  Her family history includes Cancer in her father and mother; Liver disease in her father and mother.  She  reports that she has been smoking cigarettes.  She has a 10.00 pack-year smoking history. she has never used smokeless tobacco. She reports that she does not drink alcohol or use drugs.  Current Outpatient Medications   Medication Sig Dispense Refill   • topiramate (TOPAMAX) 50 MG tablet Take 1 tablet by mouth 3 (Three) Times a Day. 90 tablet 2   • DULoxetine (CYMBALTA) 20 MG capsule      • DULoxetine (CYMBALTA) 60 MG capsule      • ketorolac (TORADOL) 10 MG tablet take 1 tablet by mouth every 6 to 8 hours if needed  0   • LORCET 5-325 MG per tablet     "  • naproxen (NAPROSYN) 500 MG tablet take 1 tablet by mouth twice a day if needed for headache and fever  0     No current facility-administered medications for this visit.      Current Outpatient Medications on File Prior to Visit   Medication Sig   • topiramate (TOPAMAX) 50 MG tablet Take 1 tablet by mouth 3 (Three) Times a Day.   • DULoxetine (CYMBALTA) 20 MG capsule    • DULoxetine (CYMBALTA) 60 MG capsule    • ketorolac (TORADOL) 10 MG tablet take 1 tablet by mouth every 6 to 8 hours if needed   • LORCET 5-325 MG per tablet    • naproxen (NAPROSYN) 500 MG tablet take 1 tablet by mouth twice a day if needed for headache and fever   • [DISCONTINUED] diclofenac (VOLTAREN) 75 MG EC tablet    • [DISCONTINUED] metroNIDAZOLE (METROGEL VAGINAL) 0.75 % vaginal gel Insert  into the vagina 2 (Two) Times a Day. Insert 1 applicator vaginally twice a week @ bedtime for 3 months     No current facility-administered medications on file prior to visit.      She is allergic to morphine and related; baclofen; iron supplement [ferrous fumarate]; and penicillins.    Social History    Tobacco Use      Smoking status: Current Every Day Smoker        Packs/day: 1.00        Years: 10.00        Pack years: 10        Types: Cigarettes      Smokeless tobacco: Never Used    Review of Systems  Consitutional POS: nothing reported    NEG: anorexia or night sweats   Gastointestinal POS: nothing reported    NEG: bloating, change in bowel habits, melena or reflux symptoms   Genitourinary POS: nothing reported    NEG: dysuria or hematuria   Integument POS: nothing reported    NEG: moles that are changing in size, shape, color or rashes   Breast POS: nothing reported    NEG: persistent breast lump, skin dimpling or nipple discharge         Respiratory: negative  Cardiovascular: negative          Objective   /68   Ht 167.6 cm (66\")   Wt 85.3 kg (188 lb)   BMI 30.34 kg/m²     General:  well developed; well nourished  no acute distress "   Skin:  No suspicious lesions seen   Thyroid: not examined   Lungs:  breathing is unlabored   Heart:  Not performed.   Breasts:  Not performed.   Abdomen: soft, non-tender; no masses  no umbilical or inginual hernias are present  no hepato-splenomegaly  Abnormal findings: moderate tenderness in the lower abdomen   Pelvis: Not performed.     Psychiatric: Alert and oriented ×3, mood and affect appropriate  HEENT: Atraumatic, normocephalic, normal scleral icterus  Extremities: 2+ pulses bilaterally, no edema      Lab Review   No data reviewed    Imaging   Pelvic ultrasound images independantly reviewed - Patient is status post hysterectomy and LSO.  Right ovary demonstrates 2 senna meter simple cyst and a 2.2 cm hemorrhagic type cyst.  There is no shadowing to indicate a solid nature to this.  There is no free fluid        Assessment   1. Lower abd pain with back pain: Do suspect this is [place] skeletal strain her exam is benign she demonstrates normal range of motion and appropriate mood and affect  2. Rcurrent D/C--patient states she had no effect after suppression for BV this summer.  The cultures were reviewed reviewed and noted to be BV only no yeast were noted.     Plan   1. Repeat TVS 4 weeks, precautoins, patient states that with any acitivy her stomach begins swelling.   2. REcords from [employer] regarding CT scan and laboratory results.  We'll call patient with these.  Spent over 25 minutes discussing the issues with patient  3.   Dietary modification for vaginaitis, cut out douching, E2 cream 1/2 applicotr 2 x week  No orders of the defined types were placed in this encounter.         This note was electronically signed.      November 28, 2018

## 2018-11-30 ENCOUNTER — TELEPHONE (OUTPATIENT)
Dept: OBSTETRICS AND GYNECOLOGY | Facility: CLINIC | Age: 28
End: 2018-11-30

## 2018-11-30 NOTE — TELEPHONE ENCOUNTER
Dr. Egan,    I called pt and she wants to know what you do you recommend her to do next because she is in a lot of pain ?

## 2018-11-30 NOTE — TELEPHONE ENCOUNTER
At this point her CT showed nothing significant and her labs were WNL. I would f/u back up with PCP. PAtient has an appt here in 4 weeks to repeat TVS.

## 2019-02-12 ENCOUNTER — OFFICE VISIT (OUTPATIENT)
Dept: OBSTETRICS AND GYNECOLOGY | Facility: CLINIC | Age: 29
End: 2019-02-12

## 2019-02-12 VITALS
WEIGHT: 194 LBS | HEIGHT: 66 IN | DIASTOLIC BLOOD PRESSURE: 70 MMHG | BODY MASS INDEX: 31.18 KG/M2 | SYSTOLIC BLOOD PRESSURE: 128 MMHG

## 2019-02-12 DIAGNOSIS — N83.201 CYST OF RIGHT OVARY: Primary | ICD-10-CM

## 2019-02-12 DIAGNOSIS — R10.2 PELVIC PAIN: ICD-10-CM

## 2019-02-12 PROCEDURE — 99214 OFFICE O/P EST MOD 30 MIN: CPT | Performed by: OBSTETRICS & GYNECOLOGY

## 2019-02-12 RX ORDER — METRONIDAZOLE 7.5 MG/G
GEL VAGINAL 2 TIMES DAILY
Qty: 70 G | Refills: 1 | Status: SHIPPED | OUTPATIENT
Start: 2019-02-12 | End: 2019-03-20

## 2019-02-12 NOTE — PROGRESS NOTES
Subjective   Chief Complaint   Patient presents with   • Follow-up     TVS-pelvic pain     Olga Flores is a 28 y.o. year old .  No LMP recorded. Patient has had a hysterectomy.  She presents to be seen because of CPP, vaginitis--long h/o douching--has sice cut this out but has used E2 cream 2 x week for past 2 months and has no impormvent. . Off-and-on patient's had Chronic pelvic pain for many years.  She's also had vaginal complaints for many years.  Gen. hysterectomy with LSO.  However her complaints today are not much different than pre-hysterectomy complaints.  She was tried on suppression for bacterial vaginitis nidus recurrence but really sounds like took none of the medicines that were prescribed nor the MetroGel suppression.  She is taken at vision cream past 2 months per her however patient is an issue noncompliance is difficult to ascertain whether this is true knot.  She states she has regular bowel bladder habits.  Sex very painful and she needs some done about this pain.    OTHER COMPLAINTS:  Nothing else    The following portions of the patient's history were reviewed and updated as appropriate:  She  has a past medical history of Abdominal pain, Abdominal swelling, Allergic bronchitis, Anxiety, Backache, Blood in stool, Change in bowel movement, Constipation, Depression, Diarrhea, Environmental allergies, GERD (gastroesophageal reflux disease), Heartburn, History of heavy periods, MRSA infection, Kidney stones, Lupus, Mass of ovary, Melena, Migraine, Nausea and vomiting, No energy, Sinus problem, Tattoo, Vertigo, and Wears glasses.  She does not have any pertinent problems on file.  She  has a past surgical history that includes Hysterectomy;  section, low transverse; Laparoscopy; Endometrial ablation; Colonoscopy; Polypectomy; Abdominal surgery; Other surgical history; and RIGHT KNEE ARTHROSCOPY WITH LATERAL RELEASE (Right, 2017).  Her family history includes Cancer in  her father and mother; Liver disease in her father and mother.  She  reports that she has been smoking cigarettes.  She has a 10.00 pack-year smoking history. she has never used smokeless tobacco. She reports that she does not drink alcohol or use drugs.  Current Outpatient Medications   Medication Sig Dispense Refill   • topiramate (TOPAMAX) 50 MG tablet Take 1 tablet by mouth 3 (Three) Times a Day. 90 tablet 2     No current facility-administered medications for this visit.      Current Outpatient Medications on File Prior to Visit   Medication Sig   • topiramate (TOPAMAX) 50 MG tablet Take 1 tablet by mouth 3 (Three) Times a Day.   • [DISCONTINUED] DULoxetine (CYMBALTA) 20 MG capsule    • [DISCONTINUED] DULoxetine (CYMBALTA) 60 MG capsule    • [DISCONTINUED] ketorolac (TORADOL) 10 MG tablet take 1 tablet by mouth every 6 to 8 hours if needed   • [DISCONTINUED] LORCET 5-325 MG per tablet    • [DISCONTINUED] naproxen (NAPROSYN) 500 MG tablet take 1 tablet by mouth twice a day if needed for headache and fever     No current facility-administered medications on file prior to visit.      She is allergic to morphine and related; baclofen; iron supplement [ferrous fumarate]; and penicillins.    Social History    Tobacco Use      Smoking status: Current Every Day Smoker        Packs/day: 1.00        Years: 10.00        Pack years: 10        Types: Cigarettes      Smokeless tobacco: Never Used    Review of Systems  Consitutional POS: nothing reported    NEG: anorexia or night sweats   Gastointestinal POS: nothing reported    NEG: bloating, change in bowel habits, melena or reflux symptoms   Genitourinary POS: nothing reported    NEG: dysuria or hematuria   Integument POS: nothing reported    NEG: moles that are changing in size, shape, color or rashes   Breast POS: nothing reported    NEG: persistent breast lump, skin dimpling or nipple discharge         Respiratory: negative  Cardiovascular: negative          Objective  "  /70   Ht 167.6 cm (66\")   Wt 88 kg (194 lb)   BMI 31.31 kg/m²     General:  well developed; well nourished  no acute distress   Skin:  No suspicious lesions seen   Thyroid: not examined   Lungs:  breathing is unlabored  clear to auscultation bilaterally   Heart:  regular rate and rhythm, S1, S2 normal, no murmur, click, rub or gallop   Breasts:  Examined in supine position  Symmetric without masses or skin dimpling  Nipples normal without inversion, lesions or discharge  There are no palpable axillary nodes   Abdomen: soft, non-tender; no masses  no umbilical or inguinal hernias are present  no hepato-splenomegaly  Abnormal findings: mild tenderness in the lower abdomen   Pelvis: Not performed.     Psychiatric: Alert and oriented ×3, mood and affect appropriate  HEENT: Atraumatic, normocephalic, normal scleral icterus  Extremities: 2+ pulses bilaterally, no edema      Lab Review   No data reviewed    Imaging   Pelvic ultrasound images independantly reviewed - Uterus and left ovary surgically absent.  Right ovary demonstrates a 4.2 cm simple ovarian cyst there is no solid mass.  There is no free fluid.        Assessment   1. REcurrent vaginitis\" no responsive to E2 cream: Patient did not expression as noted above   2. Right simple ovarian cyst 4cm  3. Lower bad pain: long h/o CPP     Plan   1. RepeaT TVS 6 weeks  2. MetroGel applicator twice a day ×2 weeks  3. Did discuss with patient potential for repeat diagnostic laparoscopy 1 have her follow-up to reassess the right ovary in 6 weeks to ensure this simple cyst is resolved.  Very reluctant to remove her right ovary and put patient on hormone replacement therapy spent over 25 minutes face face time with patient discussing these findings and options.    No orders of the defined types were placed in this encounter.         This note was electronically signed.      February 12, 2019      "

## 2019-03-20 ENCOUNTER — OFFICE VISIT (OUTPATIENT)
Dept: NEUROLOGY | Facility: CLINIC | Age: 29
End: 2019-03-20

## 2019-03-20 VITALS
HEIGHT: 66 IN | OXYGEN SATURATION: 98 % | DIASTOLIC BLOOD PRESSURE: 70 MMHG | WEIGHT: 194 LBS | SYSTOLIC BLOOD PRESSURE: 112 MMHG | HEART RATE: 80 BPM | BODY MASS INDEX: 31.18 KG/M2

## 2019-03-20 DIAGNOSIS — G43.019 INTRACTABLE MIGRAINE WITHOUT AURA AND WITHOUT STATUS MIGRAINOSUS: Primary | ICD-10-CM

## 2019-03-20 PROCEDURE — 99213 OFFICE O/P EST LOW 20 MIN: CPT | Performed by: NURSE PRACTITIONER

## 2019-03-20 RX ORDER — ASPIRIN 81 MG/1
81 TABLET ORAL DAILY
COMMUNITY

## 2019-03-20 RX ORDER — ONDANSETRON 4 MG/1
TABLET, FILM COATED ORAL
COMMUNITY
Start: 2019-02-26

## 2019-03-20 RX ORDER — NAPROXEN 250 MG/1
TABLET ORAL
Refills: 0 | COMMUNITY
Start: 2019-03-12

## 2019-04-17 RX ORDER — TOPIRAMATE 50 MG/1
TABLET, FILM COATED ORAL
Qty: 60 TABLET | Refills: 3 | Status: SHIPPED | OUTPATIENT
Start: 2019-04-17 | End: 2019-10-25 | Stop reason: SDUPTHER

## 2019-05-28 RX ORDER — METRONIDAZOLE 7.5 MG/G
GEL VAGINAL
Qty: 140 G | Refills: 0 | OUTPATIENT
Start: 2019-05-28

## 2019-10-25 RX ORDER — TOPIRAMATE 50 MG/1
TABLET, FILM COATED ORAL
Qty: 60 TABLET | Refills: 3 | Status: SHIPPED | OUTPATIENT
Start: 2019-10-25

## 2020-09-14 ENCOUNTER — TRANSCRIBE ORDERS (OUTPATIENT)
Dept: ADMINISTRATIVE | Facility: HOSPITAL | Age: 30
End: 2020-09-14

## 2020-09-14 DIAGNOSIS — R56.9: Primary | ICD-10-CM

## 2020-09-14 DIAGNOSIS — Z72.820: Primary | ICD-10-CM
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
## (undated) DEVICE — BANDAGE,GAUZE,CONFORMING,6"X80",STRL,LF: Brand: MEDLINE

## (undated) DEVICE — PK KN ARTHSCP 20

## (undated) DEVICE — BNDG ESMARK 6INX9FT STRL

## (undated) DEVICE — PENCL E/S HNDSWCH PUSHBTN HOLSTR 10FT

## (undated) DEVICE — TUBING, SUCTION, 1/4" X 12', STRAIGHT: Brand: MEDLINE

## (undated) DEVICE — SPNG GZ WOVN 4X4IN 12PLY 10/BX STRL

## (undated) DEVICE — DYONICS 25 PATIENT TUBE SET MUST                                    BE USED WITH 7211007, 12 PER BOX

## (undated) DEVICE — SUT MNCRYL PLS ANTIB UD 4/0 PS2 18IN

## (undated) DEVICE — OCCLUSIVE GAUZE STRIP,3% BISMUTH TRIBROMOPHENATE IN PETROLATUM BLEND: Brand: XEROFORM

## (undated) DEVICE — GOWN,SIRUS,NON REINFRCD,LARGE,SET IN SL: Brand: MEDLINE

## (undated) DEVICE — CHISEL VULCAN HOOK: Brand: CHISEL

## (undated) DEVICE — SUPER MULTIVAC 50 WITH INTEGRATED                                    FINGER SWITCHES IFS: Brand: COBLATION

## (undated) DEVICE — GLV SURG SENSICARE GREEN W/ALOE PF LF 8 STRL

## (undated) DEVICE — GLV SURG SENSICARE W/ALOE PF LF 7.5 STRL

## (undated) DEVICE — BNDG ELAS ELITE V/CLOSE 6IN 5YD LF STRL

## (undated) DEVICE — 4.5 MM FULL RADIUS STRAIGHT                                    BLADES, POWER/EP-1, YELLOW, PACKAGED                                    6 PER BOX, STERILE: Brand: DYONICS

## (undated) DEVICE — EMERALD ARTHROSCOPY SHEET: Brand: CONVERTORS

## (undated) DEVICE — BNDG ELAS MATRX V/CLS 4IN 5YD LF